# Patient Record
Sex: MALE | Race: OTHER | Employment: OTHER | URBAN - METROPOLITAN AREA
[De-identification: names, ages, dates, MRNs, and addresses within clinical notes are randomized per-mention and may not be internally consistent; named-entity substitution may affect disease eponyms.]

---

## 2019-03-29 ENCOUNTER — NEW PATIENT COMPREHENSIVE (OUTPATIENT)
Dept: URBAN - METROPOLITAN AREA CLINIC 43 | Facility: CLINIC | Age: 64
End: 2019-03-29

## 2019-03-29 DIAGNOSIS — E11.319: ICD-10-CM

## 2019-03-29 DIAGNOSIS — H25.813: ICD-10-CM

## 2019-03-29 DIAGNOSIS — Z79.4: ICD-10-CM

## 2019-03-29 PROCEDURE — 92004 COMPRE OPH EXAM NEW PT 1/>: CPT

## 2019-03-29 PROCEDURE — 92015 DETERMINE REFRACTIVE STATE: CPT

## 2019-03-29 PROCEDURE — 92134 CPTRZ OPH DX IMG PST SGM RTA: CPT

## 2019-03-29 ASSESSMENT — VISUAL ACUITY
OS_BAT: 20/100
OD_BAT: 20/100
OD_SC: 20/70
OS_CC: J3
OD_CC: J3
OD_CC: 20/60
OS_SC: 20/80
OD_SC: >J12
OS_SC: >J12
OS_CC: 20/30

## 2019-03-29 ASSESSMENT — TONOMETRY
OD_IOP_MMHG: 17
OS_IOP_MMHG: 17

## 2019-05-08 ENCOUNTER — CATARACT EVALUATION (OUTPATIENT)
Dept: URBAN - METROPOLITAN AREA CLINIC 43 | Facility: CLINIC | Age: 64
End: 2019-05-08

## 2019-05-08 DIAGNOSIS — H25.811: ICD-10-CM

## 2019-05-08 DIAGNOSIS — E11.319: ICD-10-CM

## 2019-05-08 DIAGNOSIS — Z79.4: ICD-10-CM

## 2019-05-08 DIAGNOSIS — H25.812: ICD-10-CM

## 2019-05-08 PROCEDURE — V2799I IMPRIMIS PREDGATIBROM

## 2019-05-08 PROCEDURE — 92134 CPTRZ OPH DX IMG PST SGM RTA: CPT

## 2019-05-08 PROCEDURE — 92136TC INTERFEROMETRY - TECHNICAL COMPONENT

## 2019-05-08 PROCEDURE — 99214 OFFICE O/P EST MOD 30 MIN: CPT

## 2019-05-08 ASSESSMENT — VISUAL ACUITY
OD_CC: J1
OD_BAT: 20/70
OS_SC: 20/100-1
OS_AM: 20/20
OS_CC: J1
OS_CC: 20/30
OD_SC: J12
OS_SC: J12
OD_SC: 20/70
OD_RAM: 20/20
OD_CC: 20/40
OS_BAT: 20/80

## 2019-05-08 ASSESSMENT — TONOMETRY
OS_IOP_MMHG: 18
OD_IOP_MMHG: 18

## 2019-05-14 ENCOUNTER — PRE-OP/H&P (OUTPATIENT)
Dept: URBAN - METROPOLITAN AREA CLINIC 39 | Facility: CLINIC | Age: 64
End: 2019-05-14

## 2019-05-14 ENCOUNTER — SURGERY/PROCEDURE (OUTPATIENT)
Dept: URBAN - METROPOLITAN AREA SURGERY 14 | Facility: SURGERY | Age: 64
End: 2019-05-14

## 2019-05-14 DIAGNOSIS — Z79.4: ICD-10-CM

## 2019-05-14 DIAGNOSIS — E11.319: ICD-10-CM

## 2019-05-14 DIAGNOSIS — H25.811: ICD-10-CM

## 2019-05-14 DIAGNOSIS — H25.812: ICD-10-CM

## 2019-05-14 PROCEDURE — 99211T TECH SERVICE

## 2019-05-14 PROCEDURE — 66984 XCAPSL CTRC RMVL W/O ECP: CPT

## 2019-05-15 ENCOUNTER — POST OP/EVAL OF SECOND EYE (OUTPATIENT)
Dept: URBAN - METROPOLITAN AREA CLINIC 43 | Facility: CLINIC | Age: 64
End: 2019-05-15

## 2019-05-15 DIAGNOSIS — H25.812: ICD-10-CM

## 2019-05-15 DIAGNOSIS — Z96.1: ICD-10-CM

## 2019-05-15 PROCEDURE — 99024 POSTOP FOLLOW-UP VISIT: CPT

## 2019-05-15 PROCEDURE — 92012 INTRM OPH EXAM EST PATIENT: CPT

## 2019-05-15 ASSESSMENT — TONOMETRY
OD_IOP_MMHG: 15.5
OS_IOP_MMHG: 16

## 2019-05-15 ASSESSMENT — VISUAL ACUITY
OS_BAT: 20/80
OS_CC: 20/30
OD_SC: 20/40-1

## 2019-05-21 ENCOUNTER — PRE-OP/H&P (OUTPATIENT)
Dept: URBAN - METROPOLITAN AREA CLINIC 39 | Facility: CLINIC | Age: 64
End: 2019-05-21

## 2019-05-21 ENCOUNTER — SURGERY/PROCEDURE (OUTPATIENT)
Dept: URBAN - METROPOLITAN AREA SURGERY 14 | Facility: SURGERY | Age: 64
End: 2019-05-21

## 2019-05-21 DIAGNOSIS — H25.812: ICD-10-CM

## 2019-05-21 DIAGNOSIS — Z96.1: ICD-10-CM

## 2019-05-21 PROCEDURE — 66984 XCAPSL CTRC RMVL W/O ECP: CPT

## 2019-05-21 PROCEDURE — 99211T TECH SERVICE

## 2019-05-21 ASSESSMENT — VISUAL ACUITY
OS_SC: J12
OS_SC: 20/100-1
OD_SC: 20/50
OD_SC: <J12

## 2019-05-21 ASSESSMENT — TONOMETRY
OD_IOP_MMHG: 17
OS_IOP_MMHG: 16

## 2019-05-22 ENCOUNTER — CATARACT POST-OP 1-DAY (OUTPATIENT)
Dept: URBAN - METROPOLITAN AREA CLINIC 43 | Facility: CLINIC | Age: 64
End: 2019-05-22

## 2019-05-22 DIAGNOSIS — Z96.1: ICD-10-CM

## 2019-05-22 PROCEDURE — 99024 POSTOP FOLLOW-UP VISIT: CPT

## 2019-05-22 ASSESSMENT — TONOMETRY
OS_IOP_MMHG: 19
OD_IOP_MMHG: 20

## 2019-05-22 ASSESSMENT — VISUAL ACUITY
OS_SC: 20/70+1
OD_SC: <J12
OS_SC: <J12
OD_SC: 20/50+1

## 2019-06-07 ENCOUNTER — REFRACTION ONLY (OUTPATIENT)
Dept: URBAN - METROPOLITAN AREA CLINIC 43 | Facility: CLINIC | Age: 64
End: 2019-06-07

## 2019-06-07 DIAGNOSIS — Z96.1: ICD-10-CM

## 2019-06-07 PROCEDURE — 99024 POSTOP FOLLOW-UP VISIT: CPT

## 2019-06-07 ASSESSMENT — VISUAL ACUITY
OD_SC: 20/40-1
OS_SC: 20/40-1

## 2019-06-07 ASSESSMENT — TONOMETRY
OD_IOP_MMHG: 15
OS_IOP_MMHG: 15

## 2020-12-16 ENCOUNTER — RETINA CONSULT (OUTPATIENT)
Dept: URBAN - METROPOLITAN AREA CLINIC 43 | Facility: CLINIC | Age: 65
End: 2020-12-16

## 2020-12-16 DIAGNOSIS — Z79.4: ICD-10-CM

## 2020-12-16 DIAGNOSIS — H35.033: ICD-10-CM

## 2020-12-16 DIAGNOSIS — E11.3311: ICD-10-CM

## 2020-12-16 DIAGNOSIS — H35.09: ICD-10-CM

## 2020-12-16 DIAGNOSIS — H35.30: ICD-10-CM

## 2020-12-16 DIAGNOSIS — E11.3312: ICD-10-CM

## 2020-12-16 PROCEDURE — 92287 ANT SGM IMG IR FLRSCN ANGRPH: CPT

## 2020-12-16 PROCEDURE — 67210 TREATMENT OF RETINAL LESION: CPT

## 2020-12-16 PROCEDURE — 92014 COMPRE OPH EXAM EST PT 1/>: CPT

## 2020-12-16 PROCEDURE — 92273 FULL FIELD ERG W/I&R: CPT

## 2020-12-16 PROCEDURE — 92250 FUNDUS PHOTOGRAPHY W/I&R: CPT

## 2020-12-16 PROCEDURE — 92235 FLUORESCEIN ANGRPH MLTIFRAME: CPT

## 2020-12-16 PROCEDURE — 92134 CPTRZ OPH DX IMG PST SGM RTA: CPT

## 2020-12-16 ASSESSMENT — VISUAL ACUITY
OD_CC: 20/25
OS_CC: 20/20-2

## 2020-12-16 ASSESSMENT — TONOMETRY
OD_IOP_MMHG: 16
OS_IOP_MMHG: 17

## 2020-12-18 ENCOUNTER — CLINIC PROCEDURE ONLY (OUTPATIENT)
Dept: URBAN - METROPOLITAN AREA CLINIC 43 | Facility: CLINIC | Age: 65
End: 2020-12-18

## 2020-12-18 DIAGNOSIS — Z79.4: ICD-10-CM

## 2020-12-18 DIAGNOSIS — E11.3312: ICD-10-CM

## 2020-12-18 PROCEDURE — 67210 TREATMENT OF RETINAL LESION: CPT

## 2020-12-18 ASSESSMENT — VISUAL ACUITY: OS_CC: 20/25

## 2020-12-18 ASSESSMENT — TONOMETRY: OS_IOP_MMHG: 17

## 2021-04-23 ENCOUNTER — ESTABLISHED COMPREHENSIVE EXAM (OUTPATIENT)
Dept: URBAN - METROPOLITAN AREA CLINIC 43 | Facility: CLINIC | Age: 66
End: 2021-04-23

## 2021-04-23 DIAGNOSIS — H35.033: ICD-10-CM

## 2021-04-23 DIAGNOSIS — Z79.4: ICD-10-CM

## 2021-04-23 DIAGNOSIS — E11.3312: ICD-10-CM

## 2021-04-23 DIAGNOSIS — E11.3311: ICD-10-CM

## 2021-04-23 PROCEDURE — 92235 FLUORESCEIN ANGRPH MLTIFRAME: CPT

## 2021-04-23 PROCEDURE — 92273 FULL FIELD ERG W/I&R: CPT

## 2021-04-23 PROCEDURE — 99214 OFFICE O/P EST MOD 30 MIN: CPT

## 2021-04-23 PROCEDURE — 92250 FUNDUS PHOTOGRAPHY W/I&R: CPT

## 2021-04-23 ASSESSMENT — VISUAL ACUITY
OD_CC: 20/20-1
OS_CC: 20/25-2

## 2021-04-23 ASSESSMENT — TONOMETRY
OS_IOP_MMHG: 13
OD_IOP_MMHG: 17

## 2021-09-24 ENCOUNTER — ESTABLISHED COMPREHENSIVE EXAM (OUTPATIENT)
Dept: URBAN - METROPOLITAN AREA CLINIC 43 | Facility: CLINIC | Age: 66
End: 2021-09-24

## 2021-09-24 DIAGNOSIS — Z79.4: ICD-10-CM

## 2021-09-24 DIAGNOSIS — E11.3311: ICD-10-CM

## 2021-09-24 DIAGNOSIS — E11.3312: ICD-10-CM

## 2021-09-24 DIAGNOSIS — H35.033: ICD-10-CM

## 2021-09-24 PROCEDURE — 92235 FLUORESCEIN ANGRPH MLTIFRAME: CPT

## 2021-09-24 PROCEDURE — 92273 FULL FIELD ERG W/I&R: CPT

## 2021-09-24 PROCEDURE — 99214 OFFICE O/P EST MOD 30 MIN: CPT

## 2021-09-24 PROCEDURE — 92287 ANT SGM IMG IR FLRSCN ANGRPH: CPT

## 2021-09-24 PROCEDURE — 92250 FUNDUS PHOTOGRAPHY W/I&R: CPT

## 2021-09-24 ASSESSMENT — TONOMETRY
OS_IOP_MMHG: 18
OD_IOP_MMHG: 20

## 2021-09-24 ASSESSMENT — VISUAL ACUITY
OD_CC: 20/40-2
OS_CC: 20/25-2

## 2021-12-14 NOTE — PROCEDURE NOTE: SURGICAL
<p>Prior to commencing surgery patient identification, surgical procedure, site, and side were confirmed by Dr. Deirdre Moreland. Following topical proparacaine anesthesia, the patient was positioned at the YAG laser, a contact lens coupled to the cornea with methylcellulose and an axial posterior capsulotomy performed without complication using 3.1 Mj x 43. Excess methylcellulose was washed from the eye, one drop of Alphagan was instilled and the patient returned to the holding area having tolerated the procedure well and without complication. </p><p> [id number]</p>

## 2022-04-01 ENCOUNTER — COMPREHENSIVE EXAM (OUTPATIENT)
Dept: URBAN - METROPOLITAN AREA CLINIC 43 | Facility: CLINIC | Age: 67
End: 2022-04-01

## 2022-04-01 DIAGNOSIS — E11.3311: ICD-10-CM

## 2022-04-01 DIAGNOSIS — Z96.1: ICD-10-CM

## 2022-04-01 DIAGNOSIS — Z79.4: ICD-10-CM

## 2022-04-01 DIAGNOSIS — H35.033: ICD-10-CM

## 2022-04-01 DIAGNOSIS — E11.3312: ICD-10-CM

## 2022-04-01 DIAGNOSIS — H35.30: ICD-10-CM

## 2022-04-01 DIAGNOSIS — H35.09: ICD-10-CM

## 2022-04-01 PROCEDURE — 92014 COMPRE OPH EXAM EST PT 1/>: CPT

## 2022-04-01 PROCEDURE — 92250 FUNDUS PHOTOGRAPHY W/I&R: CPT

## 2022-04-01 PROCEDURE — 92015 DETERMINE REFRACTIVE STATE: CPT

## 2022-04-01 ASSESSMENT — TONOMETRY
OD_IOP_MMHG: 20
OS_IOP_MMHG: 19

## 2022-04-01 ASSESSMENT — VISUAL ACUITY
OD_SC: 20/50-1
OD_SC: J12
OS_CC: 20/25
OD_CC: J2
OD_CC: 20/30-2
OS_SC: J12
OS_SC: 20/50-1
OS_CC: J4

## 2022-06-10 ENCOUNTER — EMERGENCY VISIT (OUTPATIENT)
Dept: URBAN - METROPOLITAN AREA CLINIC 43 | Facility: CLINIC | Age: 67
End: 2022-06-10

## 2022-06-10 DIAGNOSIS — Z79.4: ICD-10-CM

## 2022-06-10 DIAGNOSIS — H35.09: ICD-10-CM

## 2022-06-10 DIAGNOSIS — E11.3313: ICD-10-CM

## 2022-06-10 DIAGNOSIS — H35.033: ICD-10-CM

## 2022-06-10 DIAGNOSIS — H35.30: ICD-10-CM

## 2022-06-10 PROCEDURE — 99213 OFFICE O/P EST LOW 20 MIN: CPT

## 2022-06-10 ASSESSMENT — TONOMETRY
OS_IOP_MMHG: 14
OD_IOP_MMHG: 15

## 2022-06-10 ASSESSMENT — VISUAL ACUITY
OS_CC: 20/25+2
OD_BAT: <20/400
OD_CC: 20/40+1

## 2022-06-23 ENCOUNTER — ESTABLISHED PATIENT (OUTPATIENT)
Dept: URBAN - METROPOLITAN AREA CLINIC 39 | Facility: CLINIC | Age: 67
End: 2022-06-23

## 2022-06-23 ENCOUNTER — SURGERY/PROCEDURE (OUTPATIENT)
Dept: URBAN - METROPOLITAN AREA SURGERY 14 | Facility: SURGERY | Age: 67
End: 2022-06-23

## 2022-06-23 DIAGNOSIS — H26.491: ICD-10-CM

## 2022-06-23 PROCEDURE — 66821 AFTER CATARACT LASER SURGERY: CPT

## 2022-06-23 PROCEDURE — 92014 COMPRE OPH EXAM EST PT 1/>: CPT

## 2022-06-23 ASSESSMENT — TONOMETRY
OS_IOP_MMHG: 16
OD_IOP_MMHG: 15

## 2022-06-23 ASSESSMENT — VISUAL ACUITY
OD_BAT: 20/400
OD_CC: 20/40

## 2022-07-01 ENCOUNTER — POST-OP (OUTPATIENT)
Dept: URBAN - METROPOLITAN AREA CLINIC 43 | Facility: CLINIC | Age: 67
End: 2022-07-01

## 2022-07-01 DIAGNOSIS — Z98.890: ICD-10-CM

## 2022-07-01 DIAGNOSIS — H35.033: ICD-10-CM

## 2022-07-01 PROCEDURE — 99024 POSTOP FOLLOW-UP VISIT: CPT

## 2022-07-01 ASSESSMENT — VISUAL ACUITY
OS_SC: 20/30-2
OD_SC: 20/30-2

## 2022-07-01 ASSESSMENT — TONOMETRY
OD_IOP_MMHG: 15
OS_IOP_MMHG: 15

## 2023-04-11 ENCOUNTER — COMPREHENSIVE EXAM (OUTPATIENT)
Dept: URBAN - METROPOLITAN AREA CLINIC 43 | Facility: CLINIC | Age: 68
End: 2023-04-11

## 2023-05-09 ENCOUNTER — COMPREHENSIVE EXAM (OUTPATIENT)
Dept: URBAN - METROPOLITAN AREA CLINIC 43 | Facility: CLINIC | Age: 68
End: 2023-05-09

## 2023-05-09 DIAGNOSIS — H35.033: ICD-10-CM

## 2023-05-09 DIAGNOSIS — Z96.1: ICD-10-CM

## 2023-05-09 DIAGNOSIS — Z79.4: ICD-10-CM

## 2023-05-09 DIAGNOSIS — E11.3313: ICD-10-CM

## 2023-05-09 PROCEDURE — 92014 COMPRE OPH EXAM EST PT 1/>: CPT

## 2023-05-09 PROCEDURE — 92015 DETERMINE REFRACTIVE STATE: CPT

## 2023-05-09 PROCEDURE — 92250 FUNDUS PHOTOGRAPHY W/I&R: CPT

## 2023-05-09 ASSESSMENT — VISUAL ACUITY
OS_CC: J2-
OD_CC: J1
OD_SC: <J12
OS_CC: 20/20
OD_CC: 20/20-1
OS_SC: 20/25
OD_SC: 20/30+2
OS_SC: J12

## 2023-05-09 ASSESSMENT — TONOMETRY
OD_IOP_MMHG: 19
OS_IOP_MMHG: 16

## 2024-03-22 ENCOUNTER — EMERGENCY VISIT (OUTPATIENT)
Dept: URBAN - METROPOLITAN AREA CLINIC 43 | Facility: CLINIC | Age: 69
End: 2024-03-22

## 2024-03-22 DIAGNOSIS — H26.492: ICD-10-CM

## 2024-03-22 DIAGNOSIS — H35.033: ICD-10-CM

## 2024-03-22 DIAGNOSIS — Z79.4: ICD-10-CM

## 2024-03-22 DIAGNOSIS — E11.3313: ICD-10-CM

## 2024-03-22 PROCEDURE — 99213 OFFICE O/P EST LOW 20 MIN: CPT

## 2024-03-22 PROCEDURE — 92134 CPTRZ OPH DX IMG PST SGM RTA: CPT

## 2024-03-22 ASSESSMENT — VISUAL ACUITY
OD_CC: 20/25
OS_CC: J1
OD_CC: J1
OS_BAT: 20/60
OS_CC: 20/30

## 2024-03-22 ASSESSMENT — TONOMETRY
OS_IOP_MMHG: 20
OD_IOP_MMHG: 20

## 2024-04-11 ENCOUNTER — CONSULTATION/EVALUATION (OUTPATIENT)
Dept: URBAN - METROPOLITAN AREA CLINIC 39 | Facility: CLINIC | Age: 69
End: 2024-04-11

## 2024-04-11 ENCOUNTER — SURGERY/PROCEDURE (OUTPATIENT)
Facility: LOCATION | Age: 69
End: 2024-04-11

## 2024-04-11 DIAGNOSIS — H26.492: ICD-10-CM

## 2024-04-11 PROCEDURE — 66821 AFTER CATARACT LASER SURGERY: CPT

## 2024-04-11 PROCEDURE — 99214 OFFICE O/P EST MOD 30 MIN: CPT | Mod: 57

## 2024-04-11 ASSESSMENT — VISUAL ACUITY
OS_BAT: 20/60
OS_SC: 20/25
OD_SC: 20/20

## 2024-04-11 ASSESSMENT — TONOMETRY
OD_IOP_MMHG: 14
OS_IOP_MMHG: 13

## 2024-04-12 ENCOUNTER — POST-OP (OUTPATIENT)
Dept: URBAN - METROPOLITAN AREA CLINIC 43 | Facility: CLINIC | Age: 69
End: 2024-04-12

## 2024-04-12 DIAGNOSIS — E11.3311: ICD-10-CM

## 2024-04-12 DIAGNOSIS — Z79.4: ICD-10-CM

## 2024-04-12 DIAGNOSIS — H35.30: ICD-10-CM

## 2024-04-12 DIAGNOSIS — E11.3392: ICD-10-CM

## 2024-04-12 DIAGNOSIS — Z98.890: ICD-10-CM

## 2024-04-12 PROCEDURE — 92250 FUNDUS PHOTOGRAPHY W/I&R: CPT

## 2024-04-12 PROCEDURE — 99024 POSTOP FOLLOW-UP VISIT: CPT

## 2024-04-12 ASSESSMENT — VISUAL ACUITY
OS_CC: J1
OS_CC: 20/20-2
OD_SC: 20/40-1
OS_SC: 20/30-1
OD_SC: <J12
OS_SC: <J12
OD_CC: 20/20
OD_CC: J1

## 2024-04-12 ASSESSMENT — TONOMETRY
OS_IOP_MMHG: 16
OD_IOP_MMHG: 18

## 2024-08-01 ENCOUNTER — FOLLOW UP (OUTPATIENT)
Dept: URBAN - METROPOLITAN AREA CLINIC 43 | Facility: CLINIC | Age: 69
End: 2024-08-01

## 2024-08-01 DIAGNOSIS — Z98.890: ICD-10-CM

## 2024-08-01 DIAGNOSIS — E11.3592: ICD-10-CM

## 2024-08-01 DIAGNOSIS — E11.3311: ICD-10-CM

## 2024-08-01 DIAGNOSIS — Z79.4: ICD-10-CM

## 2024-08-01 PROCEDURE — 99214 OFFICE O/P EST MOD 30 MIN: CPT

## 2024-08-01 PROCEDURE — 92250 FUNDUS PHOTOGRAPHY W/I&R: CPT

## 2024-08-01 PROCEDURE — 92134 CPTRZ OPH DX IMG PST SGM RTA: CPT

## 2024-08-01 ASSESSMENT — VISUAL ACUITY
OD_CC: 20/20
OU_CC: J1
OD_CC: J1
OS_CC: J10
OU_CC: 20/20
OS_CC: 20/60

## 2024-08-01 ASSESSMENT — TONOMETRY
OD_IOP_MMHG: 20
OS_IOP_MMHG: 14

## 2024-08-02 ENCOUNTER — COMPREHENSIVE EXAM (OUTPATIENT)
Dept: URBAN - METROPOLITAN AREA CLINIC 43 | Facility: CLINIC | Age: 69
End: 2024-08-02

## 2024-08-02 DIAGNOSIS — H35.30: ICD-10-CM

## 2024-08-02 DIAGNOSIS — Z79.4: ICD-10-CM

## 2024-08-02 DIAGNOSIS — H35.09: ICD-10-CM

## 2024-08-02 DIAGNOSIS — H33.42: ICD-10-CM

## 2024-08-02 DIAGNOSIS — H35.371: ICD-10-CM

## 2024-08-02 DIAGNOSIS — H43.12: ICD-10-CM

## 2024-08-02 DIAGNOSIS — E11.3513: ICD-10-CM

## 2024-08-02 DIAGNOSIS — H35.033: ICD-10-CM

## 2024-08-02 PROCEDURE — 92273 FULL FIELD ERG W/I&R: CPT

## 2024-08-02 PROCEDURE — J2778DME LUCENTIS 0.3MG: Mod: JZ,RT

## 2024-08-02 PROCEDURE — 67028 INJECTION EYE DRUG: CPT | Mod: 50,LT

## 2024-08-02 PROCEDURE — 92287 ANT SGM IMG IR FLRSCN ANGRPH: CPT

## 2024-08-02 PROCEDURE — J2778DME LUCENTIS 0.3MG: Mod: JZ,LT

## 2024-08-02 PROCEDURE — 76512 OPH US DX B-SCAN: CPT

## 2024-08-02 PROCEDURE — 99214 OFFICE O/P EST MOD 30 MIN: CPT | Mod: 25

## 2024-08-02 PROCEDURE — 92235 FLUORESCEIN ANGRPH MLTIFRAME: CPT

## 2024-08-02 PROCEDURE — 92134 CPTRZ OPH DX IMG PST SGM RTA: CPT

## 2024-08-02 ASSESSMENT — VISUAL ACUITY
OS_PH: 20/50+2
OS_CC: 20/60-1
OD_CC: 20/25-1

## 2024-08-02 ASSESSMENT — TONOMETRY
OD_IOP_MMHG: 12
OS_IOP_MMHG: 13

## 2024-08-07 ENCOUNTER — FOLLOW UP (OUTPATIENT)
Dept: URBAN - METROPOLITAN AREA CLINIC 36 | Facility: CLINIC | Age: 69
End: 2024-08-07

## 2024-08-07 DIAGNOSIS — H33.42: ICD-10-CM

## 2024-08-07 DIAGNOSIS — H43.12: ICD-10-CM

## 2024-08-07 DIAGNOSIS — H35.371: ICD-10-CM

## 2024-08-07 DIAGNOSIS — H35.30: ICD-10-CM

## 2024-08-07 DIAGNOSIS — H35.033: ICD-10-CM

## 2024-08-07 DIAGNOSIS — H35.09: ICD-10-CM

## 2024-08-07 DIAGNOSIS — Z79.4: ICD-10-CM

## 2024-08-07 DIAGNOSIS — E11.3513: ICD-10-CM

## 2024-08-07 PROCEDURE — 67228 TREATMENT X10SV RETINOPATHY: CPT

## 2024-08-07 PROCEDURE — 99214 OFFICE O/P EST MOD 30 MIN: CPT | Mod: 25

## 2024-08-07 RX ORDER — PREDNISOLONE ACETATE 10 MG/ML
1 SUSPENSION/ DROPS OPHTHALMIC
Start: 2024-08-07

## 2024-08-07 RX ORDER — MOXIFLOXACIN OPHTHALMIC 5 MG/ML: 1 SOLUTION/ DROPS OPHTHALMIC

## 2024-08-07 RX ORDER — ERYTHROMYCIN 5 MG/G: OINTMENT OPHTHALMIC EVERY EVENING

## 2024-08-07 ASSESSMENT — TONOMETRY
OD_IOP_MMHG: 19
OS_IOP_MMHG: 15

## 2024-08-07 ASSESSMENT — VISUAL ACUITY
OD_SC: 20/20-2
OS_SC: 20/80+1

## 2024-08-16 ENCOUNTER — FOLLOW UP (OUTPATIENT)
Dept: URBAN - METROPOLITAN AREA CLINIC 43 | Facility: CLINIC | Age: 69
End: 2024-08-16

## 2024-08-16 DIAGNOSIS — H33.42: ICD-10-CM

## 2024-08-16 DIAGNOSIS — Z79.4: ICD-10-CM

## 2024-08-16 DIAGNOSIS — H35.033: ICD-10-CM

## 2024-08-16 DIAGNOSIS — H43.12: ICD-10-CM

## 2024-08-16 DIAGNOSIS — E11.3513: ICD-10-CM

## 2024-08-16 DIAGNOSIS — H35.30: ICD-10-CM

## 2024-08-16 DIAGNOSIS — H35.371: ICD-10-CM

## 2024-08-16 DIAGNOSIS — H35.09: ICD-10-CM

## 2024-08-16 PROCEDURE — 92134 CPTRZ OPH DX IMG PST SGM RTA: CPT

## 2024-08-16 PROCEDURE — 67515 INJECT/TREAT EYE SOCKET: CPT

## 2024-08-16 PROCEDURE — 99213 OFFICE O/P EST LOW 20 MIN: CPT | Mod: 24

## 2024-08-16 ASSESSMENT — VISUAL ACUITY
OS_PH: 20/70+2
OS_CC: 20/80
OD_CC: 20/20

## 2024-08-16 ASSESSMENT — TONOMETRY
OS_IOP_MMHG: 14
OD_IOP_MMHG: 15

## 2024-09-17 ENCOUNTER — FOLLOW UP (OUTPATIENT)
Dept: URBAN - METROPOLITAN AREA CLINIC 46 | Facility: CLINIC | Age: 69
End: 2024-09-17

## 2024-09-17 DIAGNOSIS — H43.12: ICD-10-CM

## 2024-09-17 DIAGNOSIS — H33.42: ICD-10-CM

## 2024-09-17 PROCEDURE — 99212 OFFICE O/P EST SF 10 MIN: CPT

## 2024-09-17 RX ORDER — NEOMYCIN SULFATE, POLYMYXIN B SULFATE AND DEXAMETHASONE 3.5; 10000; 1 MG/ML; [USP'U]/ML; MG/ML
1 SUSPENSION OPHTHALMIC
Start: 2024-09-17

## 2024-09-27 ENCOUNTER — FOLLOW UP (OUTPATIENT)
Dept: URBAN - METROPOLITAN AREA CLINIC 43 | Facility: CLINIC | Age: 69
End: 2024-09-27

## 2024-09-27 DIAGNOSIS — H43.12: ICD-10-CM

## 2024-09-27 DIAGNOSIS — H53.9: ICD-10-CM

## 2024-09-27 DIAGNOSIS — H33.42: ICD-10-CM

## 2024-09-27 PROCEDURE — 99212 OFFICE O/P EST SF 10 MIN: CPT

## 2024-09-27 RX ORDER — PREDNISOLONE ACETATE 10 MG/ML: 1 SUSPENSION/ DROPS OPHTHALMIC

## 2024-10-25 ENCOUNTER — FOLLOW UP WITH CLINIC PROCEDURE (OUTPATIENT)
Dept: URBAN - METROPOLITAN AREA CLINIC 43 | Facility: CLINIC | Age: 69
End: 2024-10-25

## 2024-10-25 DIAGNOSIS — Z79.4: ICD-10-CM

## 2024-10-25 DIAGNOSIS — H33.42: ICD-10-CM

## 2024-10-25 DIAGNOSIS — H43.12: ICD-10-CM

## 2024-10-25 DIAGNOSIS — H53.9: ICD-10-CM

## 2024-10-25 DIAGNOSIS — E11.3513: ICD-10-CM

## 2024-10-25 PROCEDURE — 99212 OFFICE O/P EST SF 10 MIN: CPT

## 2024-10-25 PROCEDURE — 92250 FUNDUS PHOTOGRAPHY W/I&R: CPT

## 2024-12-06 ENCOUNTER — COMPREHENSIVE EXAM (OUTPATIENT)
Age: 69
End: 2024-12-06

## 2024-12-06 DIAGNOSIS — H35.09: ICD-10-CM

## 2024-12-06 DIAGNOSIS — H35.30: ICD-10-CM

## 2024-12-06 DIAGNOSIS — H33.42: ICD-10-CM

## 2024-12-06 DIAGNOSIS — H35.033: ICD-10-CM

## 2024-12-06 DIAGNOSIS — Z79.4: ICD-10-CM

## 2024-12-06 DIAGNOSIS — E11.3513: ICD-10-CM

## 2024-12-06 DIAGNOSIS — H43.12: ICD-10-CM

## 2024-12-06 PROCEDURE — 92235 FLUORESCEIN ANGRPH MLTIFRAME: CPT

## 2024-12-06 PROCEDURE — J2778DME LUCENTIS 0.3MG: Mod: JZ,LT

## 2024-12-06 PROCEDURE — 92287 ANT SGM IMG IR FLRSCN ANGRPH: CPT

## 2024-12-06 PROCEDURE — 99213 OFFICE O/P EST LOW 20 MIN: CPT | Mod: 25

## 2024-12-06 PROCEDURE — 92250 FUNDUS PHOTOGRAPHY W/I&R: CPT

## 2024-12-06 PROCEDURE — 67028 INJECTION EYE DRUG: CPT | Mod: 50,RT

## 2024-12-06 PROCEDURE — J2778DME LUCENTIS 0.3MG: Mod: JZ,RT

## 2024-12-13 ENCOUNTER — CLINIC PROCEDURE ONLY (OUTPATIENT)
Age: 69
End: 2024-12-13

## 2024-12-13 DIAGNOSIS — Z79.4: ICD-10-CM

## 2024-12-13 DIAGNOSIS — E11.3513: ICD-10-CM

## 2024-12-13 PROCEDURE — 67228 TREATMENT X10SV RETINOPATHY: CPT

## 2025-01-09 ENCOUNTER — EMERGENCY VISIT (OUTPATIENT)
Age: 70
End: 2025-01-09

## 2025-01-09 DIAGNOSIS — E11.3513: ICD-10-CM

## 2025-01-09 DIAGNOSIS — Z79.4: ICD-10-CM

## 2025-01-09 PROCEDURE — 92012 INTRM OPH EXAM EST PATIENT: CPT

## 2025-01-09 PROCEDURE — 92134 CPTRZ OPH DX IMG PST SGM RTA: CPT

## 2025-01-29 ENCOUNTER — CLINIC PROCEDURE ONLY (OUTPATIENT)
Age: 70
End: 2025-01-29

## 2025-01-29 DIAGNOSIS — E11.3513: ICD-10-CM

## 2025-01-29 DIAGNOSIS — Z79.4: ICD-10-CM

## 2025-01-29 PROCEDURE — 92250 FUNDUS PHOTOGRAPHY W/I&R: CPT

## 2025-01-29 PROCEDURE — 67028 INJECTION EYE DRUG: CPT | Mod: 50,RT

## 2025-01-29 PROCEDURE — J2778DME LUCENTIS 0.3MG: Mod: JZ,LT

## 2025-01-29 PROCEDURE — J2778DME LUCENTIS 0.3MG: Mod: JZ,RT

## 2025-01-29 PROCEDURE — 92273 FULL FIELD ERG W/I&R: CPT

## 2025-03-14 ENCOUNTER — COMPREHENSIVE EXAM (OUTPATIENT)
Age: 70
End: 2025-03-14

## 2025-03-14 DIAGNOSIS — H33.42: ICD-10-CM

## 2025-03-14 DIAGNOSIS — H43.12: ICD-10-CM

## 2025-03-14 DIAGNOSIS — H35.033: ICD-10-CM

## 2025-03-14 DIAGNOSIS — Z79.4: ICD-10-CM

## 2025-03-14 DIAGNOSIS — E11.3513: ICD-10-CM

## 2025-03-14 DIAGNOSIS — H35.371: ICD-10-CM

## 2025-03-14 DIAGNOSIS — H04.123: ICD-10-CM

## 2025-03-14 PROCEDURE — 92287 ANT SGM IMG IR FLRSCN ANGRPH: CPT

## 2025-03-14 PROCEDURE — 92134 CPTRZ OPH DX IMG PST SGM RTA: CPT

## 2025-03-14 PROCEDURE — J2778DME LUCENTIS 0.3MG: Mod: JZ,LT

## 2025-03-14 PROCEDURE — 99213 OFFICE O/P EST LOW 20 MIN: CPT | Mod: 25

## 2025-03-14 PROCEDURE — 92235 FLUORESCEIN ANGRPH MLTIFRAME: CPT

## 2025-03-14 PROCEDURE — 67028 INJECTION EYE DRUG: CPT | Mod: 50,RT

## 2025-03-14 PROCEDURE — J2778DME LUCENTIS 0.3MG: Mod: JZ,RT

## 2025-04-23 ENCOUNTER — COMPREHENSIVE EXAM (OUTPATIENT)
Age: 70
End: 2025-04-23

## 2025-04-23 DIAGNOSIS — H43.12: ICD-10-CM

## 2025-04-23 DIAGNOSIS — H04.123: ICD-10-CM

## 2025-04-23 DIAGNOSIS — H35.371: ICD-10-CM

## 2025-04-23 DIAGNOSIS — E11.3513: ICD-10-CM

## 2025-04-23 DIAGNOSIS — Z79.4: ICD-10-CM

## 2025-04-23 DIAGNOSIS — H35.033: ICD-10-CM

## 2025-04-23 DIAGNOSIS — H33.42: ICD-10-CM

## 2025-04-23 PROCEDURE — J2777PFS VABYSMO PFS: Mod: JZ,LT

## 2025-04-23 PROCEDURE — J2777PFS VABYSMO PFS: Mod: JZ,RT

## 2025-04-23 PROCEDURE — 92134 CPTRZ OPH DX IMG PST SGM RTA: CPT

## 2025-04-23 PROCEDURE — 99214 OFFICE O/P EST MOD 30 MIN: CPT | Mod: 25

## 2025-04-23 PROCEDURE — 67028 INJECTION EYE DRUG: CPT | Mod: 50,RT

## 2025-06-04 ENCOUNTER — CLINIC PROCEDURE ONLY (OUTPATIENT)
Age: 70
End: 2025-06-04

## 2025-06-04 DIAGNOSIS — Z79.4: ICD-10-CM

## 2025-06-04 DIAGNOSIS — E11.3512: ICD-10-CM

## 2025-06-04 PROCEDURE — 92273 FULL FIELD ERG W/I&R: CPT

## 2025-06-04 PROCEDURE — J2777PFS VABYSMO PFS: Mod: JZ,LT

## 2025-06-04 PROCEDURE — 92250 FUNDUS PHOTOGRAPHY W/I&R: CPT

## 2025-06-04 PROCEDURE — 67028 INJECTION EYE DRUG: CPT

## 2025-07-16 ENCOUNTER — CLINIC PROCEDURE ONLY (OUTPATIENT)
Age: 70
End: 2025-07-16

## 2025-07-16 DIAGNOSIS — Z79.4: ICD-10-CM

## 2025-07-16 DIAGNOSIS — E11.3513: ICD-10-CM

## 2025-07-16 PROCEDURE — 67028 INJECTION EYE DRUG: CPT | Mod: 50,RT

## 2025-07-16 PROCEDURE — J2777PFS VABYSMO PFS: Mod: JZ,RT

## 2025-07-16 PROCEDURE — 92287 ANT SGM IMG IR FLRSCN ANGRPH: CPT

## 2025-07-16 PROCEDURE — J2777PFS VABYSMO PFS: Mod: JZ,LT

## 2025-07-16 PROCEDURE — 92235 FLUORESCEIN ANGRPH MLTIFRAME: CPT

## 2025-08-27 ENCOUNTER — CLINIC PROCEDURE ONLY (OUTPATIENT)
Age: 70
End: 2025-08-27

## 2025-08-27 DIAGNOSIS — E11.3512: ICD-10-CM

## 2025-08-27 DIAGNOSIS — Z79.4: ICD-10-CM

## 2025-08-27 DIAGNOSIS — E11.3513: ICD-10-CM

## 2025-08-27 PROCEDURE — 67028 INJECTION EYE DRUG: CPT

## 2025-08-27 PROCEDURE — J2777PFS VABYSMO PFS: Mod: JZ,LT

## 2025-08-27 PROCEDURE — 92250 FUNDUS PHOTOGRAPHY W/I&R: CPT
